# Patient Record
Sex: MALE | Race: WHITE | Employment: FULL TIME | URBAN - NONMETROPOLITAN AREA
[De-identification: names, ages, dates, MRNs, and addresses within clinical notes are randomized per-mention and may not be internally consistent; named-entity substitution may affect disease eponyms.]

---

## 2019-07-12 ENCOUNTER — HOSPITAL ENCOUNTER (EMERGENCY)
Age: 39
Discharge: HOME | End: 2019-07-12
Attending: EMERGENCY MEDICINE
Payer: COMMERCIAL

## 2019-07-12 VITALS
HEIGHT: 74 IN | WEIGHT: 293 LBS | SYSTOLIC BLOOD PRESSURE: 140 MMHG | TEMPERATURE: 97.4 F | BODY MASS INDEX: 37.6 KG/M2 | DIASTOLIC BLOOD PRESSURE: 88 MMHG | OXYGEN SATURATION: 95 % | HEART RATE: 76 BPM | RESPIRATION RATE: 18 BRPM

## 2019-07-12 DIAGNOSIS — M25.462 KNEE EFFUSION, LEFT: Primary | ICD-10-CM

## 2019-07-12 DIAGNOSIS — Z87.39 HISTORY OF GOUT: ICD-10-CM

## 2019-07-12 LAB
CHARACTER,SYN.FL: ABNORMAL
COLOR FLUID: ABNORMAL
CRYSTALS, FLUID: ABNORMAL
MONONUCLEAR CELLS SYNOVIAL FLUID: 21 % (ref 0–74)
PATHOLOGIST REVIEW: ABNORMAL
POLYMORPHONUCLEAR CELLS SYNOVIAL FLUID: 79 % (ref 0–24)
TOTAL NUCLEATED CELLS SYNOVIAL: 9970 /CUMM (ref 0–150)
TOTAL VOLUME RECEIVED SYNOVIAL: 25 ML

## 2019-07-12 PROCEDURE — 89050 BODY FLUID CELL COUNT: CPT

## 2019-07-12 PROCEDURE — 20610 DRAIN/INJ JOINT/BURSA W/O US: CPT

## 2019-07-12 PROCEDURE — 2500000003 HC RX 250 WO HCPCS: Performed by: EMERGENCY MEDICINE

## 2019-07-12 PROCEDURE — 2709999900 HC NON-CHARGEABLE SUPPLY

## 2019-07-12 PROCEDURE — 99283 EMERGENCY DEPT VISIT LOW MDM: CPT

## 2019-07-12 PROCEDURE — 89060 EXAM SYNOVIAL FLUID CRYSTALS: CPT

## 2019-07-12 PROCEDURE — 87205 SMEAR GRAM STAIN: CPT

## 2019-07-12 PROCEDURE — 87070 CULTURE OTHR SPECIMN AEROBIC: CPT

## 2019-07-12 RX ORDER — PREDNISONE 10 MG/1
TABLET ORAL
Qty: 36 TABLET | Refills: 0 | Status: SHIPPED | OUTPATIENT
Start: 2019-07-12

## 2019-07-12 RX ORDER — LIDOCAINE HYDROCHLORIDE 10 MG/ML
5 INJECTION, SOLUTION INFILTRATION; PERINEURAL ONCE
Status: COMPLETED | OUTPATIENT
Start: 2019-07-12 | End: 2019-07-12

## 2019-07-12 RX ORDER — HYDROCODONE BITARTRATE AND ACETAMINOPHEN 5; 325 MG/1; MG/1
1 TABLET ORAL EVERY 6 HOURS PRN
Qty: 12 TABLET | Refills: 0 | Status: SHIPPED | OUTPATIENT
Start: 2019-07-12 | End: 2019-07-19

## 2019-07-12 RX ADMIN — LIDOCAINE HYDROCHLORIDE 5 ML: 10 INJECTION, SOLUTION INFILTRATION; PERINEURAL at 10:41

## 2019-07-12 ASSESSMENT — PAIN SCALES - GENERAL
PAINLEVEL_OUTOF10: 0
PAINLEVEL_OUTOF10: 8
PAINLEVEL_OUTOF10: 0

## 2019-07-12 ASSESSMENT — PAIN DESCRIPTION - FREQUENCY
FREQUENCY: INTERMITTENT
FREQUENCY: CONTINUOUS

## 2019-07-12 ASSESSMENT — PAIN DESCRIPTION - ORIENTATION
ORIENTATION: LEFT
ORIENTATION: LEFT

## 2019-07-12 ASSESSMENT — PAIN DESCRIPTION - LOCATION
LOCATION: KNEE
LOCATION: KNEE

## 2019-07-12 ASSESSMENT — PAIN DESCRIPTION - PAIN TYPE
TYPE: ACUTE PAIN;CHRONIC PAIN
TYPE: ACUTE PAIN

## 2019-07-12 ASSESSMENT — PAIN DESCRIPTION - DESCRIPTORS: DESCRIPTORS: ACHING

## 2019-07-12 NOTE — ED TRIAGE NOTES
Pt here with left knee swelling. 5 days, been working so hasn't had time to get it looked at. H/o gout and knee swleling. Wants it drained or sent somewhere to have it drained.

## 2019-07-14 LAB
AEROBIC CULTURE: NORMAL
GRAM STAIN RESULT: NORMAL

## 2025-01-31 NOTE — ED PROVIDER NOTES
Remedios Bernstein  [address] [address] [place] [address]  Phone: [phone]    Emergency Department encounter      [facility]    Chief Complaint   Patient presents with    Joint Swelling     left knee, 5 days    Gout       HPI    Fozia Escudero is a 41 y.o. male who presents above-noted complaint. Patient had worsening left knee pain and discomfort since last few days. He has a history of gout. He was not sure if it was gout or inflammation. Denies other symptoms such as high fever chills trauma injury or other findings. He is otherwise in good health. PAST MEDICAL HISTORY    Past Medical History:   Diagnosis Date    Gout        SURGICAL HISTORY    History reviewed. No pertinent surgical history. CURRENT MEDICATIONS        ALLERGIES    No Known Allergies    FAMILY HISTORY    History reviewed. No pertinent family history.     SOCIAL HISTORY    Social History     Socioeconomic History    Marital status:      Spouse name: None    Number of children: None    Years of education: None    Highest education level: None   Occupational History    None   Social Needs    Financial resource strain: None    Food insecurity:     Worry: None     Inability: None    Transportation needs:     Medical: None     Non-medical: None   Tobacco Use    Smoking status: Never Smoker    Smokeless tobacco: Never Used   Substance and Sexual Activity    Alcohol use: None    Drug use: None    Sexual activity: None   Lifestyle    Physical activity:     Days per week: None     Minutes per session: None    Stress: None   Relationships    Social connections:     Talks on phone: None     Gets together: None     Attends Taoism service: None     Active member of club or organization: None     Attends meetings of clubs or organizations: None     Relationship status: None    Intimate partner violence:     Fear of current or ex partner: None     Emotionally abused: None     Physically abused: None     Forced sexual activity: None   Other Topics Concern    None   Social History Narrative    None       REVIEW OF SYSTEMS    For knee pain. No high fever or chills. No chest pain or shortness of breath. No bowel bladder habit changes. All systems negative except as marked. PHYSICAL EXAM    VITAL SIGNS: BP (!) 175/106   Pulse 90   Temp 97.4 °F (36.3 °C) (Oral)   Resp 18   Ht 6' 2\" (1.88 m)   Wt 293 lb (132.9 kg)   SpO2 97%   BMI 37.62 kg/m²    Constitutional:  Alert not toxtic or ill,   HENT:  Normocephalic, Atraumatic  Cervical Spine: Normal range of motion,  No stridor. Eyes:  No discharge or  Swelling  Respiratory: No respiratory distress  Musculoskeletal:  Intact distal pulses, large palpable effusion to the left knee. Some increased warmth but no induration or other focal findings. Integument:  Warm, Dry, No erythema, No rash (on exposed areas)   Neurologic:  Alert & appropriate   Psychiatric:  Affect normal    EKG                      RADIOLOGY    No orders to display       PROCEDURES    Arthrocentesis  Date/Time: 7/12/2019 10:05 AM  Performed by: Shahnaz Santiago MD  Authorized by: Shahnaz Santiago MD     Consent:     Consent obtained:  Verbal    Consent given by:  Patient    Risks discussed:  Bleeding and infection  Location:     Location:  Knee  Anesthesia (see MAR for exact dosages): Anesthesia method:  Local infiltration    Local anesthetic:  Lidocaine 1% w/o epi  Procedure details:     Needle gauge:  18 G    Ultrasound guidance: no      Approach:  Lateral    Aspirate characteristics:  Blood-tinged    Steroid injected: no      Specimen collected: yes    Post-procedure details:     Patient tolerance of procedure: Tolerated well, no immediate complications          CONSULTS:  None        ED COURSE & MEDICAL DECISION MAKING    Pertinent Labs & Imaging studies reviewed. (See chart for details)  PResents with left knee effusion.   History of gout in the past.  He also has clear lungs